# Patient Record
Sex: MALE | Race: WHITE
[De-identification: names, ages, dates, MRNs, and addresses within clinical notes are randomized per-mention and may not be internally consistent; named-entity substitution may affect disease eponyms.]

---

## 2019-08-13 ENCOUNTER — HOSPITAL ENCOUNTER (EMERGENCY)
Dept: HOSPITAL 10 - FTE | Age: 49
Discharge: HOME | End: 2019-08-13
Payer: MEDICAID

## 2019-08-13 ENCOUNTER — HOSPITAL ENCOUNTER (EMERGENCY)
Dept: HOSPITAL 91 - FTE | Age: 49
Discharge: HOME | End: 2019-08-13
Payer: MEDICAID

## 2019-08-13 VITALS
BODY MASS INDEX: 30.47 KG/M2 | HEIGHT: 66 IN | WEIGHT: 189.6 LBS | BODY MASS INDEX: 30.47 KG/M2 | WEIGHT: 189.6 LBS | HEIGHT: 66 IN
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
## 2019-08-13 DIAGNOSIS — I83.892: ICD-10-CM

## 2019-08-13 DIAGNOSIS — S90.512A: Primary | ICD-10-CM
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
## 2019-08-13 PROCEDURE — 99282 EMERGENCY DEPT VISIT SF MDM: CPT

## 2019-08-13 NOTE — ERD
ER Documentation


Chief Complaint


Chief Complaint





BLEEDING POSTERIOR LOWER LT LEG S/P HITTING VARICOSE VEIN





HPI


This is a 49-year-old male patient who presents emergency room with complaint of


bleeding to his left ankle after scratching a varicose vein.  No fevers, no 


injury, no chronic medical problems.





ROS


All systems reviewed and are negative except as per history of present illness.





Medications


Home Meds


Reported Medications


Ibuprofen (Motrin) 600 Mg Tablet


   6/21/13





Allergies


Allergies:  


Coded Allergies:  


     No Known Allergy (Unverified , 6/21/13)





PMhx/Soc


Medical and Surgical Hx:  pt denies Medical Hx, pt denies Surgical Hx


Hx Alcohol Use:  No


Hx Substance Use:  No


Hx Tobacco Use:  No


Smoking Status:  Never smoker





Physical Exam


Vitals





Vital Signs


  Date      Temp  Pulse  Resp  B/P (MAP)   Pulse Ox  O2          O2 Flow    FiO2


Time                                                 Delivery    Rate


   8/13/19  97.4    100    19      156/87        97


     21:08                          (110)





Physical Exam


Const:   No acute distress


Head:   Atraumatic 


Eyes:    Normal Conjunctiva


ENT:    Normal External Ears, Nose and Mouth.


Neck:               Full range of motion. No meningismus.


Resp:   Clear to auscultation bilaterally


Cardio:   Regular rate and rhythm, no murmurs


Abd:    Soft, non tender, non distended. Normal bowel sounds


Skin:   No petechiae or rashes. LLE: no swelling, no erythema, no puncture, no 


laceration, no bleeding abrasions, no hematoma


Back:   No midline or flank tenderness


Ext:    No cyanosis, or edema


Neur:   Awake and alert


Psych:    Normal Mood and Affect


Results 24 hrs





Current Medications


 Medications
   Dose
          Sig/Cecelia
       Start Time
   Status  Last


 (Trade)       Ordered        Route
 PRN     Stop Time              Admin
Dose


                              Reason                                Admin


 Silver         1 stick        ONCE  ONCE
    8/13/19       DC       



Nitrate
                      TOP
           22:00



(Silver                                      8/13/19 22:01


Nitrate



Swabs)








Procedures/MDM


PROCEDURES/MDM








EKG:





MDM:


Is a 49-year-old male patient with diffuse varicose veins on bilateral lower 


extremities that he has had for years and is genetic.  Patient states he was 


getting out of the shower and scratched his left lateral ankle and scratched 


open 1 of his varicose veins.  Patient applied pressure dressing at home 


bleeding controlled at time of evaluation.  No need for repair.  She was 


instructed on future occurrences of bleeding varicose veins and maintaining 


pressure dressing and returning to the emergency room for treatment if bleeding 


continues after 15 minutes of continuous pressure.  Patient has been instructed 


to wear his compression stockings to avoid bulging varicose veins and to follow-


up with his primary care doctor for further evaluation.  Sensation and motor 


intact, no erythema, low suspicion for neurovascular injury or infection.  Be 


bleeding controlled, patient ambulatory, low suspicion for DVT, arterial 


laceration, necrotizing infection.








DISPOSITION and PLAN: 


RX: None


The patient has been discharge home to follow-up with community physician.





Departure


Diagnosis:  


   Primary Impression:  


   Abrasion


   Additional Impression:  


   Varicose vein of leg


   Varicose vein complication:  other  Laterality:  left  Qualified Codes:  


   I83.892 - Varicose veins of left lower extremity with other complications


Condition:  Stable











BATOOL CONTRERAS NP              Aug 13, 2019 21:51